# Patient Record
Sex: MALE | Race: WHITE
[De-identification: names, ages, dates, MRNs, and addresses within clinical notes are randomized per-mention and may not be internally consistent; named-entity substitution may affect disease eponyms.]

---

## 2018-05-13 ENCOUNTER — HOSPITAL ENCOUNTER (EMERGENCY)
Dept: HOSPITAL 11 - JP.ED | Age: 73
Discharge: HOME | End: 2018-05-13
Payer: MEDICARE
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
## 2018-05-13 DIAGNOSIS — E78.00: ICD-10-CM

## 2018-05-13 DIAGNOSIS — Z79.899: ICD-10-CM

## 2018-05-13 DIAGNOSIS — S30.861A: Primary | ICD-10-CM

## 2018-05-13 DIAGNOSIS — I10: ICD-10-CM

## 2018-05-13 NOTE — EDM.PDOC
ED HPI GENERAL MEDICAL PROBLEM





- General


Chief Complaint: Bite:Animal, Insect


Stated Complaint: WOOD TICK ON BELLY AREA


Time Seen by Provider: 05/13/18 08:40


Source of Information: Reports: Patient, Family, RN Notes Reviewed


History Limitations: Reports: No Limitations





- History of Present Illness


INITIAL COMMENTS - FREE TEXT/NARRATIVE: 





72-year-old gentleman presents to the emergency department today with a tick 

attached to his abdomen, he was out in the woods yesterday found the tick this 

morning it is a deer tick no complaints at this time





- Related Data


 Allergies











Allergy/AdvReac Type Severity Reaction Status Date / Time


 


No Known Allergies Allergy   Verified 05/13/18 08:24











Home Meds: 


 Home Meds





Losartan [Cozaar] 1 tab PO DAILY 05/13/18 [History]


Morphine Sulfate 1 tab PO DAILY 05/13/18 [History]


Morphine Sulfate [Morphine Sulfate ER] 1 tab PO DAILY 05/13/18 [History]


Pravastatin [Pravachol] 1 tab PO DAILY 05/13/18 [History]


Triamterene/Hydrochlorothiazid [Triamterene-HCTZ 37.5-25 MG] 0.5 tab PO DAILY 05 /13/18 [History]











Past Medical History


HEENT History: Reports: Hard of Hearing, Impaired Vision


Cardiovascular History: Reports: High Cholesterol, Hypertension


Musculoskeletal History: Reports: Amputation, Arthritis, Back Pain, Chronic


Neurological History: Reports: Concussion





- Past Surgical History


GI Surgical History: Reports: Appendectomy


Neurological Surgical History: Reports: Spinal Fusion, Other (See Below)


Other Neurological Surgeries/Procedures: spinal stenosis.  7 back surgeries


Musculoskeletal Surgical History: Reports: Hip Replacement





Social & Family History





- Tobacco Use


Smoking Status *Q: Never Smoker





- Recreational Drug Use


Recreational Drug Use: No





ED ROS GENERAL





- Review of Systems


Review Of Systems: See Below


Skin: Reports: Wound





ED EXAM, ANIMAL BITE





- Physical Exam


Exam: See Below


Text/Narrative:: 





Examination abdomen is soft and nontender there is a deer tick attached in the 

right lower quadrant it is removed with a forceps, complete tick removal


Exam Limited By: No Limitations


General Appearance: Alert, WD/WN, No Apparent Distress





Course





- Vital Signs


Last Recorded V/S: 





 Last Vital Signs











Temp  97.7 F   05/13/18 08:23


 


Pulse  74   05/13/18 08:23


 


Resp  14   05/13/18 08:23


 


BP  161/78 H  05/13/18 08:23


 


Pulse Ox  97   05/13/18 08:23














Departure





- Departure


Time of Disposition: 08:44


Disposition: Home, Self-Care 01


Condition: Good


Clinical Impression: 


Tick bite of abdomen


Qualifiers:


 Encounter type: initial encounter Qualified Code(s): S30.861A - Insect bite (

nonvenomous) of abdominal wall, initial encounter; W57.XXXA - Bitten or stung 

by nonvenomous insect and other nonvenomous arthropods, initial encounter








- Discharge Information


Referrals: 


Deb Prescott MD [Primary Care Provider] - 


Additional Instructions: 


Take 200 mg of the doxycycline 1, follow-up with primary care provider as 

needed





- Assessment/Plan


Plan: 





Assessment





Acuity = acute





Site and laterality = deer tick exposure  





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





Manifestations = none  





Location of injury =  Home





Lab values = none  





Plan


Prescription written for doxycycline 200 mg 1 follow-up with primary care as 

necessary  

















 This note was dictated using dragon voice recognition software please call 

with any questions on syntax or grammar.

## 2019-02-15 ENCOUNTER — HOSPITAL ENCOUNTER (EMERGENCY)
Dept: HOSPITAL 11 - JP.ED | Age: 74
Discharge: HOME | End: 2019-02-15
Payer: MEDICARE

## 2019-02-15 DIAGNOSIS — E78.00: ICD-10-CM

## 2019-02-15 DIAGNOSIS — Z79.899: ICD-10-CM

## 2019-02-15 DIAGNOSIS — Z79.82: ICD-10-CM

## 2019-02-15 DIAGNOSIS — R55: Primary | ICD-10-CM

## 2019-02-15 DIAGNOSIS — I10: ICD-10-CM

## 2019-02-15 PROCEDURE — 99284 EMERGENCY DEPT VISIT MOD MDM: CPT

## 2019-02-15 PROCEDURE — 36415 COLL VENOUS BLD VENIPUNCTURE: CPT

## 2019-02-15 PROCEDURE — 80048 BASIC METABOLIC PNL TOTAL CA: CPT

## 2019-02-15 PROCEDURE — 81001 URINALYSIS AUTO W/SCOPE: CPT

## 2019-02-15 PROCEDURE — 84484 ASSAY OF TROPONIN QUANT: CPT

## 2019-02-15 PROCEDURE — 85027 COMPLETE CBC AUTOMATED: CPT

## 2019-02-15 NOTE — EDM.PDOC
ED HPI GENERAL MEDICAL PROBLEM





- General


Chief Complaint: Cardiovascular Problem


Stated Complaint: MEDICAL VIA NORTH


Time Seen by Provider: 02/15/19 19:35


Source of Information: Reports: Patient, EMS, Family, Old Records


History Limitations: Reports: No Limitations





- History of Present Illness


INITIAL COMMENTS - FREE TEXT/NARRATIVE: 





72 yo male here via EMS from a local restaurant after a syncopal episode. Felt 

like he might pass out with light-headedness and then passed out with his head 

on the table. Has a remote hx of a similar event. Recently had a stent(s) 

placed at [facility] and is wearing a heart monitor. Was unaware of rapid, 

slow or irregular heart beat before/during his spell today. He denies any 

recent CP, bleeding, fever, or diarrhea/vomiting. Was diaphoretic when he came 

to today. Feels normal now in the ER. Is not diabetic. 





[facility] did not call him after this episode so wife/patient feel that this 

must not have been due to a heart rhythm issue as that is what happens since 

wearing the monitor. 


Onset: Today


Onset Date: 02/15/19


Onset Time: 18:55


Duration: Minutes:, Resolved Prior to Arrival


Location: Reports: Generalized


Quality: Reports: Other (no pain)


Severity: Severe


Improves with: Reports: Other (time)


Worsens with: Reports: Other (unknown)


Context: Reports: Other (unknown, See HPI)


Associated Symptoms: Reports: Diaphoresis, Syncope.  Denies: Chest Pain, Fever/

Chills, Seizure


Treatments PTA: Reports: Other (see below) (none)





- Related Data


 Allergies











Allergy/AdvReac Type Severity Reaction Status Date / Time


 


No Known Allergies Allergy   Verified 02/15/19 19:48











Home Meds: 


 Home Meds





Losartan [Cozaar] 100 mg PO DAILY 05/13/18 [History]


Morphine Sulfate 30 mg PO DAILY 05/13/18 [History]


Morphine Sulfate [Morphine Sulfate ER] 15 mg PO DAILY 05/13/18 [History]


Aspirin [Adult Low Dose Aspirin EC] 81 mg PO DAILY 02/15/19 [History]


Calcium Citrate 200 mg PO DAILY 02/15/19 [History]


Carvedilol [Coreg] 25 mg PO BID 02/15/19 [History]


Chlorthalidone 25 mg PO DAILY 02/15/19 [History]


Clopidogrel [Plavix] 75 mg PO DAILY 02/15/19 [History]


Magnesium 200 mg PO DAILY 02/15/19 [History]


Potassium Chloride 20 meq PO DAILY 02/15/19 [History]


atorvaSTATin Calcium [Atorvastatin Calcium] 80 mg PO BEDTIME 02/15/19 [History]











Past Medical History


HEENT History: Reports: Hard of Hearing, Impaired Vision


Cardiovascular History: Reports: High Cholesterol, Hypertension


Musculoskeletal History: Reports: Amputation, Arthritis, Back Pain, Chronic


Neurological History: Reports: Concussion





- Past Surgical History


GI Surgical History: Reports: Appendectomy


Neurological Surgical History: Reports: Spinal Fusion, Other (See Below)


Other Neurological Surgeries/Procedures: spinal stenosis.  7 back surgeries


Musculoskeletal Surgical History: Reports: Hip Replacement





ED ROS GENERAL





- Review of Systems


Review Of Systems: See Below


Constitutional: Reports: Diaphoresis


HEENT: Reports: No Symptoms


Respiratory: Reports: No Symptoms


Cardiovascular: Reports: Syncope


Endocrine: Reports: No Symptoms


GI/Abdominal: Reports: No Symptoms


: Reports: No Symptoms


Musculoskeletal: Reports: No Symptoms


Skin: Reports: Diaphoresis


Neurological: Reports: No Symptoms


Psychiatric: Reports: No Symptoms





ED EXAM, GENERAL





- Physical Exam


Exam: See Below


Exam Limited By: No Limitations


General Appearance: Alert, WD/WN, No Apparent Distress


Eye Exam: Bilateral Eye: Normal Inspection


Ears: Normal External Exam, Normal Canal, Hearing Grossly Normal, Normal TMs


Ear Exam: Bilateral Ear: Auricle Normal, Canal Normal, TM normal


Nose: Normal Inspection, Normal Mucosa, No Blood


Throat/Mouth: Normal Inspection, Normal Lips, Normal Oropharynx, Normal Voice, 

No Airway Compromise


Head: Atraumatic, Normocephalic


Neck: Normal Inspection, Supple, Non-Tender


Respiratory/Chest: No Respiratory Distress, Lungs Clear, Normal Breath Sounds, 

No Accessory Muscle Use


Cardiovascular: Regular Rate, Rhythm, No Edema.  No: Bradycardia, Tachycardia


GI/Abdominal: Normal Bowel Sounds, Soft, Non-Tender, No Distention


Back Exam: Normal Inspection.  No: CVA Tenderness (R), CVA Tenderness (L)


Extremities: Normal Inspection, Normal Range of Motion, Non-Tender, No Pedal 

Edema


Neurological: Alert, Oriented, CN II-XII Intact, Normal Cognition, No Motor/

Sensory Deficits


Psychiatric: Normal Affect, Normal Mood


Skin Exam: Warm, Dry, Intact, Normal Color, No Rash





Course





- Vital Signs


Last Recorded V/S: 


 Last Vital Signs











Temp  36.2 C   02/15/19 19:36


 


Pulse  67   02/15/19 21:32


 


Resp  13   02/15/19 21:32


 


BP  113/53 L  02/15/19 21:32


 


Pulse Ox  97   02/15/19 21:32








 





Orthostatic Blood Pressure [     113/54


Standing]                        


Orthostatic Blood Pressure [     119/53


Sitting]                         


Orthostatic Blood Pressure [     135/62


Supine]                          











- Orders/Labs/Meds


Orders: 


 Active Orders 24 hr











 Category Date Time Status


 


 Cardiac Monitoring [RC] .As Directed Care  02/15/19 19:32 Active


 


 Orthostatic Vital Signs [RC] ASDIRECTED Care  02/15/19 19:47 Active











Labs: 


 Laboratory Tests











  02/15/19 02/15/19 02/15/19 Range/Units





  19:32 19:32 21:14 


 


WBC  8.4    (4.5-11.0)  K/uL


 


RBC  4.68    (4.30-5.90)  M/uL


 


Hgb  13.1  D    (12.0-15.0)  g/dL


 


Hct  40.3    (40.0-54.0)  %


 


MCV  86    (80-98)  fL


 


MCH  28    (27-31)  pg


 


MCHC  33    (32-36)  %


 


Plt Count  184    (150-400)  K/uL


 


Sodium   132 L   (140-148)  mmol/L


 


Potassium   3.8   (3.6-5.2)  mmol/L


 


Chloride   95 L   (100-108)  mmol/L


 


Carbon Dioxide   30   (21-32)  mmol/L


 


Anion Gap   10.8   (5.0-14.0)  mmol/L


 


BUN   25 H   (7-18)  mg/dL


 


Creatinine   1.2   (0.8-1.3)  mg/dL


 


Est Cr Clr Drug Dosing   63.81   mL/min


 


Estimated GFR (MDRD)   60   (>60)  


 


Glucose   124 H   ()  mg/dL


 


Calcium   9.1   (8.5-10.1)  mg/dL


 


Troponin I   < 0.017   (0.000-0.056)  ng/mL


 


Urine Color    Yellow  


 


Urine Appearance    Clear  


 


Urine pH    6.0  (4.5-8.0)  


 


Ur Specific Gravity    1.010  (1.008-1.030)  


 


Urine Protein    Negative  (NEGATIVE)  mg/dL


 


Urine Glucose (UA)    Normal  (NEGATIVE)  mg/dL


 


Urine Ketones    Negative  (NEGATIVE)  mg/dL


 


Urine Occult Blood    Negative  (NEGATIVE)  


 


Urine Nitrite    Negative  (NEGAITVE)  


 


Urine Bilirubin    Negative  (NEGATIVE)  


 


Urine Urobilinogen    Normal  (NORMAL)  mg/dL


 


Ur Leukocyte Esterase    Negative  (NEGATIVE)  


 


Urine RBC    Not seen  (0-5)  


 


Urine WBC    Not seen  (0-5)  


 


Ur Epithelial Cells    Not seen  


 


Amorphous Sediment    Not seen  


 


Urine Bacteria    Rare  


 


Urine Mucus    Not seen  











Meds: 


Medications














Discontinued Medications














Generic Name Dose Route Start Last Admin





  Trade Name Kenyatta  PRN Reason Stop Dose Admin


 


Sodium Chloride  500 mls @ 1,000 mls/hr  02/15/19 20:13  02/15/19 20:23





  Normal Saline  IV  02/15/19 20:42  1,000 mls/hr





  .BOLUS ONE   Administration





     





     





     





     














Departure





- Departure


Time of Disposition: 22:00


Disposition: Home, Self-Care 01


Condition: Good


Clinical Impression: 


Syncope


Qualifiers:


 Syncope type: unspecified Qualified Code(s): R55 - Syncope and collapse





Instructions:  Tilt Table Test, Syncope, Easy-to-Read


Referrals: 


PCP,None [Primary Care Provider] - 


Forms:  ED Department Discharge


Additional Instructions: 


Continue your current meds. No driving. If you feel like passing out, lay down 

quickly. See your cardiologist next week, if unable then see your family 

doctor. Return as needed. 





- My Orders


Last 24 Hours: 


My Active Orders





02/15/19 19:32


Cardiac Monitoring [RC] .As Directed 





02/15/19 19:47


Orthostatic Vital Signs [RC] ASDIRECTED 














- Assessment/Plan


Last 24 Hours: 


My Active Orders





02/15/19 19:32


Cardiac Monitoring [RC] .As Directed 





02/15/19 19:47


Orthostatic Vital Signs [RC] ASDIRECTED

## 2021-08-24 NOTE — EDM.PDOC
ED HPI GENERAL MEDICAL PROBLEM





- General


Chief Complaint: Abdominal Pain


Stated Complaint: ABDOMINAL PAIN


Time Seen by Provider: 08/24/21 11:35


Source of Information: Reports: Patient, Family, Old Records, RN Notes Reviewed


History Limitations: Reports: No Limitations





- History of Present Illness


INITIAL COMMENTS - FREE TEXT/NARRATIVE: 





75-year-old gentleman presents emergency department day complaint of diarrhea, 

he states his been ill for about 3 to 4 days started having chills body aches 

fevers up to 103 the diarrhea has been about every hour. Mostly watery. He did 

report to the clinic today underwent some laboratory testing CBC CMP 

unremarkable except for potassium low at 2.9 Covid test was negative EKG shows 

multiple PVCs. I did receive a call from clinic provider Dr. King who was 

concerned about a possible small bowel obstruction however this gentleman will 

pass gas and is not having any abdominal pain at this time plain film was 

obtained at the clinic results are still pending





- Related Data


                                    Allergies











Allergy/AdvReac Type Severity Reaction Status Date / Time


 


No Known Allergies Allergy   Verified 08/24/21 11:22











Home Meds: 


                                    Home Meds





Losartan [Cozaar] 100 mg PO DAILY 05/13/18 [History]


Morphine Sulfate 30 mg PO DAILY 05/13/18 [History]


Morphine Sulfate [Morphine Sulfate ER] 15 mg PO DAILY 05/13/18 [History]


Aspirin [Adult Low Dose Aspirin EC] 81 mg PO DAILY 02/15/19 [History]


Calcium Citrate 200 mg PO DAILY 02/15/19 [History]


Chlorthalidone 25 mg PO DAILY 02/15/19 [History]


Magnesium 200 mg PO DAILY 02/15/19 [History]


Potassium Chloride 20 meq PO DAILY 02/15/19 [History]


atorvaSTATin Calcium [Atorvastatin Calcium] 80 mg PO BEDTIME 02/15/19 [History]


carvediloL [Coreg] 25 mg PO BID 02/15/19 [History]


Protandim 1 dose PO BID 08/24/21 [History]


amLODIPine Besylate [Amlodipine Besylate] 5 mg PO BEDTIME 08/24/21 [History]











Past Medical History


HEENT History: Reports: Hard of Hearing, Impaired Vision


Cardiovascular History: Reports: High Cholesterol, Hypertension


Musculoskeletal History: Reports: Amputation, Arthritis, Back Pain, Chronic


Neurological History: Reports: Concussion





- Infectious Disease History


Infectious Disease History: Reports: Chicken Pox





- Past Surgical History


Cardiovascular Surgical History: Reports: Coronary Artery Stent, Other (See 

Below)


Other Cardiovascular Surgeries/Procedures: 2/15/19 Wearing a holter monitor and 

a defibillator vest.


GI Surgical History: Reports: Appendectomy


Neurological Surgical History: Reports: Spinal Fusion, Other (See Below)


Other Neurological Surgeries/Procedures: spinal stenosis.  7 back surgeries


Musculoskeletal Surgical History: Reports: Hip Replacement





Social & Family History





- Family History


Family Medical History: Unobtainable





- Tobacco Use


Tobacco Use Status *Q: Former Tobacco User


Used Tobacco, but Quit: Yes


Month/Year Tobacco Last Used: 1980's





- Caffeine Use


Caffeine Use: Reports: Coffee, Tea





- Recreational Drug Use


Recreational Drug Use: No





ED ROS GENERAL





- Review of Systems


Review Of Systems: See Below


Constitutional: Reports: Fever, Chills


HEENT: Reports: No Symptoms


Respiratory: Reports: No Symptoms


Cardiovascular: Reports: No Symptoms


GI/Abdominal: Reports: Diarrhea, Flatus.  Denies: Abdominal Pain, Nausea


: Reports: No Symptoms


Musculoskeletal: Reports: No Symptoms





ED EXAM, GI/ABD





- Physical Exam


Exam: See Below


Exam Limited By: No Limitations


General Appearance: Alert, WD/WN, No Apparent Distress


Respiratory/Chest: No Respiratory Distress, Lungs Clear, Normal Breath Sounds, 

No Accessory Muscle Use, Chest Non-Tender


Cardiovascular: Regular Rate, Rhythm, No Murmur


GI/Abdominal Exam: Soft, Non-Tender





Course





- Vital Signs


Last Recorded V/S: 


                                Last Vital Signs











Temp  97.7 F   08/24/21 11:30


 


Pulse  81   08/24/21 14:17


 


Resp  16   08/24/21 14:17


 


BP  136/70   08/24/21 14:17


 


Pulse Ox  98   08/24/21 14:17














- Orders/Labs/Meds


Orders: 


                               Active Orders 24 hr











 Category Date Time Status


 


 Peripheral IV Care [RC] .AS DIRECTED Care  08/24/21 11:47 Active


 


 Lactated Ringers [Ringers, Lactated] 1,000 ml Med  08/24/21 12:00 Active





 IV ASDIRECTED   


 


 Sodium Chloride 0.9% [Saline Flush] Med  08/24/21 11:46 Active





 10 ml FLUSH ASDIRECTED PRN   


 


 Peripheral IV Insertion Adult [OM.PC] Urgent Oth  08/24/21 11:46 Ordered








                                Medication Orders





Lactated Ringer's (Ringers, Lactated)  1,000 mls @ 999 mls/hr IV ASDIRECTED TERESA


   Last Admin: 08/24/21 12:41  Dose: 999 mls/hr


   Documented by: JESSIKA


Sodium Chloride (Sodium Chloride 0.9% 10 Ml Syringe)  10 ml FLUSH ASDIRECTED PRN


   PRN Reason: Keep Vein Open


   Last Admin: 08/24/21 12:47  Dose: 10 ml


   Documented by: JESSIKA








Labs: 


                                Laboratory Tests











  08/24/21 08/24/21 08/24/21 Range/Units





  12:04 12:04 13:22 


 


Lactic Acid   1.7   (0.4-2.0)  mmol/L


 


Troponin I  < 0.017    (0.000-0.056)  ng/mL


 


Urine Color    Yellow  (YELLOW)  


 


Urine Appearance    Slightly cloudy A  (CLEAR)  


 


Urine pH    5.5  (5.0-8.0)  


 


Ur Specific Gravity    1.020  (1.008-1.030)  


 


Urine Protein    100 H  (NEGATIVE)  mg/dL


 


Urine Glucose (UA)    Negative  (NEGATIVE)  mg/dL


 


Urine Ketones    Negative  (NEGATIVE)  mg/dL


 


Urine Occult Blood    Moderate H  (NEGATIVE)  


 


Urine Nitrite    Negative  (NEGATIVE)  


 


Urine Bilirubin    Negative  (NEGATIVE)  


 


Urine Urobilinogen    0.2  (0.2-1.0)  EU/dL


 


Ur Leukocyte Esterase    Negative  (NEGATIVE)  


 


Urine RBC    5-10 H  (0-5)  


 


Urine WBC    Not seen  (0-5)  


 


Ur Epithelial Cells    Few  


 


Amorphous Sediment    Not seen  


 


Urine Bacteria    Not seen  


 


Urine Mucus    Moderate  


 


Urine Other      











Meds: 


Medications











Generic Name Dose Route Start Last Admin





  Trade Name Freq  PRN Reason Stop Dose Admin


 


Lactated Ringer's  1,000 mls @ 999 mls/hr  08/24/21 12:00  08/24/21 12:41





  Ringers, Lactated  IV   999 mls/hr





  ASDIRECTED TERESA   Administration


 


Sodium Chloride  10 ml  08/24/21 11:46  08/24/21 12:47





  Sodium Chloride 0.9% 10 Ml Syringe  FLUSH   10 ml





  ASDIRECTED PRN   Administration





  Keep Vein Open  














Discontinued Medications














Generic Name Dose Route Start Last Admin





  Trade Name Freq  PRN Reason Stop Dose Admin


 


Potassium Chloride 20 meq/  112 mls @ 56 mls/hr  08/24/21 12:15  08/24/21 12:43





Lidocaine HCl 2 ml/ Sodium  IV  08/24/21 14:14  56 mls/hr





Chloride  ONETIME ONE   Administration


 


Loperamide HCl  2 mg  08/24/21 11:48  08/24/21 12:26





  Loperamide 2 Mg Cap  PO  08/24/21 11:49  2 mg





  ONETIME ONE   Administration


 


Morphine Sulfate  30 mg  08/24/21 13:45  08/24/21 13:31





  Morphine 30 Mg Tab.Er  PO  08/24/21 13:46  30 mg





  ONETIME ONE   Administration


 


Ondansetron HCl  4 mg  08/24/21 13:19  08/24/21 13:24





  Ondansetron 4 Mg/2 Ml Sdv  IVPUSH  08/24/21 13:20  4 mg





  ONETIME ONE   Administration


 


Potassium Chloride  40 meq  08/24/21 11:48  08/24/21 12:27





  Potassium Chloride 20 Meq Tab.Er  PO  08/24/21 11:49  40 meq





  ONETIME ONE   Administration














Departure





- Departure


Time of Disposition: 15:32


Disposition: Home, Self-Care 01


Condition: Fair


Clinical Impression: 


 Hypokalemia








- Discharge Information


Instructions:  Hypokalemia


Referrals: 


Roslyn Ramirez MD [Primary Care Provider] - 


Forms:  ED Department Discharge


Additional Instructions: 


Continue to push fluids at home, use the Imodium as needed  please followup with

your primary care provider in 3-5 days if not better, please call return to the 

emergency department with worsening of symptoms.,





Sepsis Event Note (ED)





- Evaluation


Sepsis Screening Result: No Definite Risk





- Focused Exam


Vital Signs: 


                                   Vital Signs











  Temp Pulse Resp BP Pulse Ox


 


 08/24/21 14:17   81  16  136/70  98


 


 08/24/21 13:28   76  16  114/64  96


 


 08/24/21 12:17   73  16  111/65  99


 


 08/24/21 11:30  97.7 F  70  16  122/69  97


 


 08/24/21 11:18  97.7 F  70  16  122/69  97














- My Orders


Last 24 Hours: 


My Active Orders





08/24/21 11:46


Sodium Chloride 0.9% [Saline Flush]   10 ml FLUSH ASDIRECTED PRN 


Peripheral IV Insertion Adult [OM.PC] Urgent 





08/24/21 11:47


Peripheral IV Care [RC] .AS DIRECTED 





08/24/21 12:00


Lactated Ringers [Ringers, Lactated] 1,000 ml IV ASDIRECTED 














- Assessment/Plan


Last 24 Hours: 


My Active Orders





08/24/21 11:46


Sodium Chloride 0.9% [Saline Flush]   10 ml FLUSH ASDIRECTED PRN 


Peripheral IV Insertion Adult [OM.PC] Urgent 





08/24/21 11:47


Peripheral IV Care [RC] .AS DIRECTED 





08/24/21 12:00


Lactated Ringers [Ringers, Lactated] 1,000 ml IV ASDIRECTED 











Plan: 





Assessment





Acuity = acute





Site and laterality = hypokalemia





Etiology  = secondary to diarrhea





Manifestations = body aches





Location of injury =  Home





Lab values = lactic acid, troponin urinalysis within normal limits





Plan


Potassium was replaced both IV and oral, had good improvement with Imodium and 1

L of fluids, he is going to continue to push fluids at home and monitor his 

symptoms follow-up primary care 3 to 5 days if not better

















 This note was dictated using dragon voice recognition software please call with

any questions on syntax or grammar.

## 2021-08-27 NOTE — EDM.PDOC
ED HPI GENERAL MEDICAL PROBLEM





- General


Chief Complaint: Cardiovascular Problem


Stated Complaint: LOW BP, WEAKNESS


Time Seen by Provider: 08/27/21 20:24


Source of Information: Reports: Patient, Family (Wife at bedside)


History Limitations: Reports: No Limitations





- History of Present Illness


INITIAL COMMENTS - FREE TEXT/NARRATIVE: 





Patient presents emergency room today via EMS secondary to concern about low 

blood pressures.  His wife states that she took his blood pressure around 1940 

secondary to concern about how he looked that he seemed somewhat lethargic in 

nature.  She noted blood pressure in the right arm to be 67/42 she did repeat 

this 68/44 she then took blood pressures in his left arm noted to be 84/41 she 

then repeated it twice and noted to be 64/42 and 65/41 his pulse she says was 

stable at 63 states that she has not previously checked his blood pressure for 

over a month at that time it was noted to be in around the 110s over 60 to 70s. 

His history this past week is significant for having diarrhea Saturday through 

Tuesday, they went to the walk-in clinic on Tuesday for the continued diarrhea 

episodes he was sent to the emergency room where he received normal saline IV 

fluids as well as potassium replacement and was sent home.  Wife states that 

diarrhea continued through yesterday and she finally gave him an Imodium which 

has pretty much stopped significant loose stools per the report he states that 

he has had just a little bit and watery bowel movements today but markedly 

decreased from previous episodes.  He is eating without any difficulty he is 

taking fluids without any difficulty denies any vomiting episodes.  Of note he 

does have blood pressure medications listed and when asked when they were last 

taken wife states that he took his medications around 1830 tonight.  She has not

been checking his blood pressure prior to giving medications nor has she checked

it over the last couple of days since he was in the emergency room but he has 

continued to take his blood pressure medication





PMH/Meds--reviewed in the EMR


NKDA





Tob--former


EtOH/Drugs--denies





Denies having had COVID infection, he has had his COVID immunization





- Related Data


                                    Allergies











Allergy/AdvReac Type Severity Reaction Status Date / Time


 


No Known Allergies Allergy   Verified 08/24/21 11:22











Home Meds: 


                                    Home Meds





Losartan [Cozaar] 100 mg PO DAILY 05/13/18 [History]


Morphine Sulfate 30 mg PO DAILY 05/13/18 [History]


Morphine Sulfate [Morphine Sulfate ER] 15 mg PO DAILY 05/13/18 [History]


Aspirin [Adult Low Dose Aspirin EC] 81 mg PO DAILY 02/15/19 [History]


Calcium Citrate 200 mg PO DAILY 02/15/19 [History]


Chlorthalidone 25 mg PO DAILY 02/15/19 [History]


Magnesium 200 mg PO DAILY 02/15/19 [History]


Potassium Chloride 20 meq PO DAILY 02/15/19 [History]


atorvaSTATin Calcium [Atorvastatin Calcium] 80 mg PO BEDTIME 02/15/19 [History]


carvediloL [Coreg] 25 mg PO BID 02/15/19 [History]


Protandim 1 dose PO BID 08/24/21 [History]


amLODIPine Besylate [Amlodipine Besylate] 5 mg PO BEDTIME 08/24/21 [History]











Past Medical History


HEENT History: Reports: Hard of Hearing, Impaired Vision


Cardiovascular History: Reports: High Cholesterol, Hypertension


Musculoskeletal History: Reports: Amputation, Arthritis, Back Pain, Chronic


Neurological History: Reports: Concussion





- Infectious Disease History


Infectious Disease History: Reports: Chicken Pox





- Past Surgical History


Cardiovascular Surgical History: Reports: Coronary Artery Stent, Other (See 

Below)


Other Cardiovascular Surgeries/Procedures: 2/15/19 Wearing a holter monitor and 

a defibillator vest.


GI Surgical History: Reports: Appendectomy


Neurological Surgical History: Reports: Spinal Fusion, Other (See Below)


Other Neurological Surgeries/Procedures: spinal stenosis.  7 back surgeries


Musculoskeletal Surgical History: Reports: Hip Replacement





Social & Family History





- Family History


Family Medical History: Unobtainable





- Caffeine Use


Caffeine Use: Reports: Coffee





ED ROS GENERAL





- Review of Systems


Review Of Systems: Comprehensive ROS is negative, except as noted in HPI.


Constitutional: Reports: Weakness.  Denies: Fever, Chills, Decreased Appetite


Respiratory: Reports: No Symptoms


Cardiovascular: Reports: No Symptoms


GI/Abdominal: Reports: Diarrhea.  Denies: Abdominal Pain, Decreased Appetite, 

Nausea, Vomiting


Neurological: Denies: Dizziness





ED EXAM, GENERAL





- Physical Exam


Exam: See Below


Exam Limited By: No Limitations


General Appearance: Alert, WD/WN, No Apparent Distress


Eye Exam: Bilateral Eye: EOMI, Normal Inspection, PERRL


Ears: Normal External Exam, Hearing Grossly Normal


Nose: Normal Inspection


Throat/Mouth: Normal Inspection, Normal Lips, Normal Oropharynx, Normal Voice, 

No Airway Compromise


Head: Atraumatic, Normocephalic


Neck: Normal Inspection, Supple, Non-Tender, Full Range of Motion.  No: 

Lymphadenopathy (R), Lymphadenopathy (L)


Respiratory/Chest: No Respiratory Distress, Lungs Clear, Normal Breath Sounds


Cardiovascular: Normal Peripheral Pulses, Regular Rate, Rhythm, No Edema, No 

Murmur


Peripheral Pulses: 2+: Radial (L), Radial (R)


GI/Abdominal: Normal Bowel Sounds, Soft, Non-Tender, No Distention


 (Male) Exam: Deferred


Rectal (Males) Exam: Deferred


Back Exam: Normal Inspection, Full Range of Motion (Sat up in bed unassisted for

 exam posterior lungs as well as back he had no lightheaded or dizziness episode

 during that movement)


Extremities: Normal Inspection, Normal Range of Motion, No Pedal Edema, Normal 

Capillary Refill


Neurological: Alert, Oriented, Normal Cognition, No Motor/Sensory Deficits


Psychiatric: Normal Affect, Normal Mood


Skin Exam: Warm, Dry, Intact, Normal Color


  ** #1 Interpretation


EKG Date: 08/27/21


Time: 21:00 (EKG read by physician at 2108, no STEMI is noted)


Rhythm: NSR


Rate (Beats/Min): 64


Axis: Normal


P-Wave: Present (KS interval 156)


QRS: Normal (QRS duration 107)


ST-T: Normal


QT: Normal (QT/BMk414/422)


EKG Interpretation Comments: 





Normal EKG





Course





- Vital Signs


Text/Narrative:: 





2026--while in room after HPI/ROS/PE did discuss with them likely cause is a 

combination of his diuretic diarrhea related dehydration and continued use of 

blood pressure medications without checking home blood pressure.  Given report 

that he took his blood pressures at 1830 and then was noted to have low blood 

pressures at 1948 is likely that is a direct result of his blood pressure 

medications in the setting.  Will provide IV fluids and check electrolytes 

secondary report of having received potassium infusion on Tuesday here in the 

emergency room no further concerns are noted then will discharge home.  Did 

discuss with wife that she should check blood pressure for the next several days

 prior to giving his medications as long as blood pressure is greater than or 

equal to to 120/80 then he may have his blood pressure medications if blood 

pressure is less than 100 then he may not have his blood pressure medications 

but should hold until the next dose is due she verbalized understanding 

agreement with this plan





2116--notified of critical value by lab creatinine4.0; record reviewed for 

recent labs given ER visit on Tuesday and no creatinine is noted on those lab 

results although he did have labs completed at the clinic and it was documented 

by  Officer that CBC and CMP were unremarkable for K2.9.  This appears that 

patient is acute renal failure secondary to diarrhea induced dehydration





2129--Labs have returned as completed at this time mild white blood cell 

elevation at 11.3 chemistry panel noted for a fvevon077, potassium3.1, 

peftpbkb87 as well as elevated BUN/emrolmmtjz80/4.0 with an estimated GFR of 15.

  Magnesium was normal2.0 at this time will discuss with patient and wife ER 

findings and recommendation for admission with IV fluid hydration and following 

of labs secondary to acute renal failure.  they verbalized understanding/agreeme

nt with plan of care





2135--placed to hospitalist awaiting return call at this time for discussion of 

admission


2139--callback from Dr. Galvin hospitalist service case was discussed and he 

accepts for admission at this time will be in shortly





- Orders/Labs/Meds


Orders: 


                               Active Orders 24 hr











 Category Date Time Status


 


 Sodium Chloride 0.9% [Normal Saline] 500 ml Med  08/27/21 20:49 Active





 IV .BOLUS   


 


 Sodium Chloride 0.9% [Saline Flush] Med  08/27/21 20:49 Active





 10 ml FLUSH ASDIRECTED PRN   


 


 Saline Lock Insert [OM.PC] Routine Oth  08/27/21 20:49 Ordered


 


 EKG 12 Lead [EK] Routine Ther  08/27/21 20:49 Ordered








                                Medication Orders





Sodium Chloride (Normal Saline)  500 mls @ 250 mls/hr IV .BOLUS ONE


   Stop: 08/27/21 22:48


   Last Admin: 08/27/21 21:10  Dose: 250 mls/hr


   Documented by: MADI


Sodium Chloride (Sodium Chloride 0.9% 10 Ml Syringe)  10 ml FLUSH ASDIRECTED PRN


   PRN Reason: Keep Vein Open


   Last Admin: 08/27/21 21:11  Dose: 10 ml


   Documented by: MADI








Labs: 


                                Laboratory Tests











  08/27/21 08/27/21 Range/Units





  20:48 20:49 


 


WBC   11.3 H  (4.5-11.0)  K/uL


 


RBC   4.31  (4.30-5.90)  M/uL


 


Hgb   12.7  (12.0-15.0)  g/dL


 


Hct   37.2 L  (40.0-54.0)  %


 


MCV   86  (80-98)  fL


 


MCH   30  (27-31)  pg


 


MCHC   34  (32-36)  %


 


Plt Count   263  (150-400)  K/uL


 


Neut % (Auto)   65.0  (36-66)  %


 


Lymph % (Auto)   21.1 L  (24-44)  %


 


Mono % (Auto)   10.5 H  (2-6)  %


 


Eos % (Auto)   3.0  (2-4)  %


 


Baso % (Auto)   0.4  (0-1)  %


 


Sodium  131 L   (140-148)  mmol/L


 


Potassium  3.1 L   (3.6-5.2)  mmol/L


 


Chloride  95 L   (100-108)  mmol/L


 


Carbon Dioxide  27   (21-32)  mmol/L


 


Anion Gap  12.1   (5.0-14.0)  mmol/L


 


BUN  54 H D   (7-18)  mg/dL


 


Creatinine  4.0 H* D   (0.8-1.3)  mg/dL


 


Est Cr Clr Drug Dosing  TNP   


 


Estimated GFR (MDRD)  15 L   (>60)  


 


Glucose  117 H   ()  mg/dL


 


Calcium  8.8   (8.5-10.1)  mg/dL


 


Magnesium  2.0   (1.8-2.4)  mg/dL











Meds: 


Medications











Generic Name Dose Route Start Last Admin





  Trade Name Freq  PRN Reason Stop Dose Admin


 


Sodium Chloride  500 mls @ 250 mls/hr  08/27/21 20:49  08/27/21 21:10





  Normal Saline  IV  08/27/21 22:48  250 mls/hr





  .BOLUS ONE   Administration


 


Sodium Chloride  10 ml  08/27/21 20:49  08/27/21 21:11





  Sodium Chloride 0.9% 10 Ml Syringe  FLUSH   10 ml





  ASDIRECTED PRN   Administration





  Keep Vein Open  














Departure





- Departure


Time of Disposition: 21:43


Disposition: Refer to Observation


Clinical Impression: 


 Hypotension due to drugs, Dehydration





Diarrhea


Qualifiers:


 Diarrhea type: unspecified type Qualified Code(s): R19.7 - Diarrhea, 

unspecified





Acute renal failure


Qualifiers:


 Acute renal failure type: unspecified Qualified Code(s): N17.9 - Acute kidney 

failure, unspecified





Instructions:  Dehydration, Elderly, Easy-to-Read, Diarrhea, Adult, Easy-to-Read


Referrals: 


PCP,None [Primary Care Provider] - 


Forms:  ED Department Discharge





- My Orders


Last 24 Hours: 


My Active Orders





08/27/21 20:49


Sodium Chloride 0.9% [Normal Saline] 500 ml IV .BOLUS 


Sodium Chloride 0.9% [Saline Flush]   10 ml FLUSH ASDIRECTED PRN 


Saline Lock Insert [OM.PC] Routine 


EKG 12 Lead [EK] Routine 














- Assessment/Plan


Last 24 Hours: 


My Active Orders





08/27/21 20:49


Sodium Chloride 0.9% [Normal Saline] 500 ml IV .BOLUS 


Sodium Chloride 0.9% [Saline Flush]   10 ml FLUSH ASDIRECTED PRN 


Saline Lock Insert [OM.PC] Routine 


EKG 12 Lead [EK] Routine

## 2021-08-27 NOTE — PCM.HP.2
H&P History of Present Illness





- General


Date of Service: 08/27/21


Admit Problem/Dx: 


                           Admission Diagnosis/Problem





Admission Diagnosis/Problem      Acute kidney injury








Source of Information: Patient, Family, Provider


History Limitations: Reports: No Limitations





- History of Present Illness


Initial Comments - Free Text/Narative: 





CC: my BP was low 





HPI: Yaya presents to the emergency room tonight with hypotension noted at home 

as well as an episode of weakness and feeling off.  He reports that he was first

sick with diarrhea just shy of 1 week ago.  He had some fevers at the time of 

onset but these have resolved.  Also had some myalgias at that time but these 

have resolved.  Diarrhea persisted for a few days so he was seen in the clinic. 

Work-up there were unremarkable other than hypokalemia so he was sent to the 

emergency room a few days ago to get some potassium supplementation.  Diarrhea 

continued until yesterday but after a dose of Imodium it seems to have slowed d

own.  He reports decent intake of both food and fluids but less than normal.  He

has not had fevers in several days.  He has not had any abdominal pain or 

vomiting but has had some nausea.  Is not aware of any sick contacts nobody else

in the house is sick.  No complaints of chest pain or shortness of breath.  No 

skin rashes.  No exotic travel or suspect ingestions.





Work-up in the emergency room revealed a creatinine of 4 along with hyponatremia

and mild hypokalemia.  Patient will be admitted for management of acute renal 

injury in the setting of diarrhea with dehydration complicated by diuretic use.





- Related Data


Allergies/Adverse Reactions: 


                                    Allergies











Allergy/AdvReac Type Severity Reaction Status Date / Time


 


No Known Allergies Allergy   Verified 08/24/21 11:22











Home Medications: 


                                    Home Meds





Losartan [Cozaar] 100 mg PO DAILY 05/13/18 [History]


Morphine Sulfate 30 mg PO DAILY 05/13/18 [History]


Morphine Sulfate [Morphine Sulfate ER] 15 mg PO QPM 05/13/18 [History]


Aspirin [Adult Low Dose Aspirin EC] 81 mg PO DAILY 02/15/19 [History]


Calcium Citrate 200 mg PO DAILY 02/15/19 [History]


Chlorthalidone 25 mg PO DAILY 02/15/19 [History]


Magnesium 200 mg PO DAILY 02/15/19 [History]


Potassium Chloride 20 meq PO DAILY 02/15/19 [History]


atorvaSTATin Calcium [Atorvastatin Calcium] 80 mg PO BEDTIME 02/15/19 [History]


carvediloL [Coreg] 25 mg PO BID 02/15/19 [History]


Protandim 1 dose PO BID 08/24/21 [History]


amLODIPine Besylate [Amlodipine Besylate] 5 mg PO BEDTIME 08/24/21 [History]











Past Medical History


HEENT History: Reports: Hard of Hearing, Impaired Vision


Cardiovascular History: Reports: High Cholesterol, Hypertension


Musculoskeletal History: Reports: Amputation, Arthritis, Back Pain, Chronic


Neurological History: Reports: Concussion





- Infectious Disease History


Infectious Disease History: Reports: Chicken Pox





- Past Surgical History


Cardiovascular Surgical History: Reports: Coronary Artery Stent, Other (See 

Below)


Other Cardiovascular Surgeries/Procedures: 2/15/19 Wearing a holter monitor and 

a defibillator vest.


GI Surgical History: Reports: Appendectomy


Neurological Surgical History: Reports: Spinal Fusion, Other (See Below)


Other Neurological Surgeries/Procedures: spinal stenosis.  7 back surgeries


Musculoskeletal Surgical History: Reports: Hip Replacement





Social & Family History





- Family History


Family Medical History: Unobtainable





- Tobacco Use


Tobacco Use Status *Q: Never Tobacco User


Second Hand Smoke Exposure: No





- Caffeine Use


Caffeine Use: Reports: None





- Alcohol Use


Alcohol Use History: Yes


Alcohol Use in Last Twelve Months: No


Alcohol Use Comment: quit 1983





- Recreational Drug Use


Recreational Drug Use: No





H&P Review of Systems





- Review of Systems:


Review Of Systems: See Below


Free Text/Narrative: 





A complete 12 point review of systems was obtained.  Pertinent positives and 

negatives are noted in the history of present illness.  All other systems were 

reviewed and were negative except as noted.





Exam





- Exam


Exam: See Below





- Vital Signs


Vital Signs: 


                                Last Vital Signs











Temp  36.3 C   08/27/21 22:09


 


Pulse  65   08/27/21 22:09


 


Resp  15   08/27/21 22:09


 


BP  99/46 L  08/27/21 22:09


 


Pulse Ox  97   08/27/21 22:09











Weight: 81.647 kg





- Exam


Quality Assessment: No: Supplemental Oxygen


General: Alert, Oriented, Cooperative.  No: Mild Distress


HEENT: Conjunctiva Clear.  No: Mucosa Moist & Pink (dry), Scleral Icterus


Neck: Supple, Trachea Midline


Lungs: Clear to Auscultation, Normal Respiratory Effort


Cardiovascular: Regular Rate, Regular Rhythm, Systolic Murmur (LUSB)


GI/Abdominal Exam: Normal Bowel Sounds, Soft, Non-Tender, No Distention, No Mass


Back Exam: Normal Inspection, Full Range of Motion


Extremities: No Pedal Edema.  No: Increased Warmth


Peripheral Pulses: 2+: Dorsalis Pedis (L), Dorsalis Pedis (R)


Skin: Warm, Dry


Neuro Extensive - Mental Status: Alert, Oriented x3, Nl Response to Commands


Neuro Extensive - Motor, Sensory, Reflexes: No: Dysarthria, Abnormal Motor, 

Tremor


Psychiatric: Alert, Normal Affect





- Patient Data


Lab Results Last 24 hrs: 


                         Laboratory Results - last 24 hr











  08/27/21 08/27/21 Range/Units





  20:48 20:49 


 


WBC   11.3 H  (4.5-11.0)  K/uL


 


RBC   4.31  (4.30-5.90)  M/uL


 


Hgb   12.7  (12.0-15.0)  g/dL


 


Hct   37.2 L  (40.0-54.0)  %


 


MCV   86  (80-98)  fL


 


MCH   30  (27-31)  pg


 


MCHC   34  (32-36)  %


 


Plt Count   263  (150-400)  K/uL


 


Neut % (Auto)   65.0  (36-66)  %


 


Lymph % (Auto)   21.1 L  (24-44)  %


 


Mono % (Auto)   10.5 H  (2-6)  %


 


Eos % (Auto)   3.0  (2-4)  %


 


Baso % (Auto)   0.4  (0-1)  %


 


Sodium  131 L   (140-148)  mmol/L


 


Potassium  3.1 L   (3.6-5.2)  mmol/L


 


Chloride  95 L   (100-108)  mmol/L


 


Carbon Dioxide  27   (21-32)  mmol/L


 


Anion Gap  12.1   (5.0-14.0)  mmol/L


 


BUN  54 H D   (7-18)  mg/dL


 


Creatinine  4.0 H* D   (0.8-1.3)  mg/dL


 


Est Cr Clr Drug Dosing  TNP   


 


Estimated GFR (MDRD)  15 L   (>60)  


 


Glucose  117 H   ()  mg/dL


 


Calcium  8.8   (8.5-10.1)  mg/dL


 


Magnesium  2.0   (1.8-2.4)  mg/dL











Result Diagrams: 


                                 08/27/21 20:49





                                 08/27/21 20:48


  ** #1 Interpretation


EKG Date: 08/27/21


Rhythm: NSR


Rate (Beats/Min): 68


Axis: Normal


P-Wave: Present


QRS: Normal


ST-T: Normal


QT: Normal


NY/PQ Interval: normal


Comparison: Change From Previous EKG (compared to EKG from 11/19 there are no 

PVC's noted)


EKG Interpretation Comments: 





This EKG image was personally reviewed





Sepsis Event Note





- Evaluation


Sepsis Screening Result: No Definite Risk





- Focused Exam


Vital Signs: 


                                   Vital Signs











  Temp Pulse Resp BP Pulse Ox


 


 08/27/21 22:09  36.3 C  65  15  99/46 L  97


 


 08/27/21 21:33   65  15  99/46 L  97


 


 08/27/21 21:03   68  19  81/45 L  100


 


 08/27/21 20:33   71  18  93/50 L 


 


 08/27/21 20:23  36.3 C  68  21 H  94/45 L  98














*Q Meaningful Use (ADM)





- VTE Risk Assess *Q


Each Risk Factor Represents 1 Point: None


Total Score 1 Point Risk Factors: 0


Each Risk Factor Represents 2 Points: Age 60 - 74 Years


Total Score 2 Point Risk Factors: 2


Each Risk Factor Represents 3 Points: None


Total Score 3 Point Risk Factors: 0


Each Risk Factor Represents 5 Points: None


Total Score 5 Point Risk Factors: 0


Venous Thromboembolism Risk Factor Score *Q: 2





- Problem List


(1) Acute renal failure


SNOMED Code(s): 51594590


   ICD Code: N17.9 - ACUTE KIDNEY FAILURE, UNSPECIFIED   Status: Acute   Current

 Visit: Yes   


Qualifiers: 


   Acute renal failure type: with acute tubular necrosis   Qualified Code(s): 

N17.0 - Acute kidney failure with tubular necrosis   





(2) Hypokalemia


SNOMED Code(s): 90241060


   ICD Code: E87.6 - HYPOKALEMIA   Status: Acute   Current Visit: No   





(3) Diarrhea


SNOMED Code(s): 91132293


   ICD Code: R19.7 - DIARRHEA, UNSPECIFIED   Status: Acute   Current Visit: Yes 

  


Qualifiers: 


   Diarrhea type: unspecified type   Qualified Code(s): R19.7 - Diarrhea, 

unspecified   





(4) Dehydration


SNOMED Code(s): 57020728


   ICD Code: E86.0 - DEHYDRATION   Status: Acute   Current Visit: Yes   





(5) CAD (coronary artery disease)


SNOMED Code(s): 38208315


   ICD Code: I25.10 - ATHSCL HEART DISEASE OF NATIVE CORONARY ARTERY W/O ANG 

PCTRS   Status: Chronic   Current Visit: Yes   


Qualifiers: 


   Coronary Disease-Associated Artery/Lesion type: native artery   Native vs. 

transplanted heart: native heart   Associated angina: without angina   Qualified

 Code(s): I25.10 - Atherosclerotic heart disease of native coronary artery wi

thout angina pectoris   





(6) HTN (hypertension)


SNOMED Code(s): 51811443


   ICD Code: I10 - ESSENTIAL (PRIMARY) HYPERTENSION   Status: Chronic   Current 

Visit: Yes   


Qualifiers: 


   Hypertension type: primary hypertension   Qualified Code(s): I10 - Essential 

(primary) hypertension   


Problem List Initiated/Reviewed/Updated: Yes


Orders Last 24hrs: 


                               Active Orders 24 hr











 Category Date Time Status


 


 Patient Status Manage Transfer [TRANSFER] Routine ADT  08/27/21 22:42 Ordered


 


 CORONAVIRUS COVID-19 RAPID [MOLEC] Stat Lab  08/27/21 21:43 Ordered


 


 Sodium Chloride 0.9% [Normal Saline] 500 ml Med  08/27/21 20:49 Active





 IV .BOLUS   


 


 Sodium Chloride 0.9% [Saline Flush] Med  08/27/21 20:49 Active





 10 ml FLUSH ASDIRECTED PRN   


 


 Saline Lock Insert [OM.PC] Routine Oth  08/27/21 20:49 Ordered


 


 Resuscitation Status Routine Resus Stat  08/27/21 22:44 Ordered


 


 EKG 12 Lead [EK] Routine Ther  08/27/21 20:49 Ordered








                                Medication Orders





Sodium Chloride (Normal Saline)  500 mls @ 250 mls/hr IV .BOLUS ONE


   Stop: 08/27/21 22:48


   Last Admin: 08/27/21 21:10  Dose: 250 mls/hr


   Documented by: MADI


Sodium Chloride (Sodium Chloride 0.9% 10 Ml Syringe)  10 ml FLUSH ASDIRECTED PRN


   PRN Reason: Keep Vein Open


   Last Admin: 08/27/21 21:11  Dose: 10 ml


   Documented by: MADI








Assessment/Plan Comment:: 





ASSESSMENT AND PLAN - 





Acute kidney injury-secondary to acute tubular necrosis in the setting of 

diarrhea leading to dehydration complicated by diuretic use.  He was hypotensive

 at home but blood pressures in the emergency room have been acceptable though 

on the low side of normal.  He did take his antihypertensives tonight.


-Aggressive IV fluids through the night


-Repeat labs in the morning


-Hold BP meds





Acute diarrhea-sounds like a viral illness and seems to be resolving.  White 

count mildly elevated but examination benign and much less diarrhea today.  

Hopefully this will improve further with hydration.


-Consider additional work-up if diarrhea worsens


-Management as above


-Imodium as needed





Essential hypertension-on multiple antihypertensives which will be on hold until

 his blood pressure and kidney function improved.





Coronary artery disease-asymptomatic.


-Continue aspirin but hold other medical management until blood pressure 

improves





Maintenance issues - 


-DVT prophylaxis-mechanical


-GI prophylaxis-not indicated


-Nutrition-regular


-Solares catheter-not indicated





CODE STATUS -full code





Admission justification -this patient will be admitted for inpatient services 

and is medically appropriate meeting medical necessity for inpatient admission 

as outlined in my documentation.  I reasonably expect the patient will require 

inpatient services that span a period time over 2 midnights. I reasonably expect

 this patient to be discharged or transferred within 96 hours after admission to

 the [facility].





Disposition -I anticipate discharge home after the hospital stay





Primary care physician -Dr. Roslyn Galvin M.D.





- Mortality Measure


Prognosis:: Good

## 2021-08-28 NOTE — PCM.PN
- General Info


Date of Service: 08/28/21


Subjective Update: 





There were no acute events overnight.  Patient reports that he feels well and 

offers no concerns.  No headache, shortness of breath, abdominal pain or nausea.

 No diarrhea since admission.  Creatinine slightly better and potassium is 

slightly better.  Blood pressures are still on the low side of normal but slowly

improving.


Functional Status: Reports: Pain Controlled, Tolerating Diet





- Review of Systems


General: Denies: Fever, Weakness


Gastrointestinal: Denies: Diarrhea





- Patient Data


Vitals - Most Recent: 


                                Last Vital Signs











Temp  37.3 C   08/28/21 07:52


 


Pulse  69   08/28/21 07:52


 


Resp  18   08/28/21 07:52


 


BP  102/55 L  08/28/21 07:52


 


Pulse Ox  97   08/28/21 07:52











Weight - Most Recent: 85.729 kg


I&O - Last 24 Hours: 


                                 Intake & Output











 08/27/21 08/28/21 08/28/21





 22:59 06:59 14:59


 


Intake Total  240 360


 


Output Total  1 


 


Balance  239 360











Lab Results Last 24 Hours: 


                         Laboratory Results - last 24 hr











  08/27/21 08/27/21 08/27/21 Range/Units





  20:48 20:49 21:43 


 


WBC   11.3 H   (4.5-11.0)  K/uL


 


RBC   4.31   (4.30-5.90)  M/uL


 


Hgb   12.7   (12.0-15.0)  g/dL


 


Hct   37.2 L   (40.0-54.0)  %


 


MCV   86   (80-98)  fL


 


MCH   30   (27-31)  pg


 


MCHC   34   (32-36)  %


 


Plt Count   263   (150-400)  K/uL


 


Neut % (Auto)   65.0   (36-66)  %


 


Lymph % (Auto)   21.1 L   (24-44)  %


 


Mono % (Auto)   10.5 H   (2-6)  %


 


Eos % (Auto)   3.0   (2-4)  %


 


Baso % (Auto)   0.4   (0-1)  %


 


Sodium  131 L    (140-148)  mmol/L


 


Potassium  3.1 L    (3.6-5.2)  mmol/L


 


Chloride  95 L    (100-108)  mmol/L


 


Carbon Dioxide  27    (21-32)  mmol/L


 


Anion Gap  12.1    (5.0-14.0)  mmol/L


 


BUN  54 H D    (7-18)  mg/dL


 


Creatinine  4.0 H* D    (0.8-1.3)  mg/dL


 


Est Cr Clr Drug Dosing  TNP    


 


Estimated GFR (MDRD)  15 L    (>60)  


 


Glucose  117 H    ()  mg/dL


 


Calcium  8.8    (8.5-10.1)  mg/dL


 


Magnesium  2.0    (1.8-2.4)  mg/dL


 


SARS CoV-2 RNA Rapid CHRISTOPHER    Negative  














  08/28/21 08/28/21 Range/Units





  04:20 04:20 


 


WBC  10.4   (4.5-11.0)  K/uL


 


RBC  4.07 L   (4.30-5.90)  M/uL


 


Hgb  11.7 L   (12.0-15.0)  g/dL


 


Hct  34.9 L   (40.0-54.0)  %


 


MCV  86   (80-98)  fL


 


MCH  29   (27-31)  pg


 


MCHC  34   (32-36)  %


 


Plt Count  245   (150-400)  K/uL


 


Neut % (Auto)    (36-66)  %


 


Lymph % (Auto)    (24-44)  %


 


Mono % (Auto)    (2-6)  %


 


Eos % (Auto)    (2-4)  %


 


Baso % (Auto)    (0-1)  %


 


Sodium   132 L  (140-148)  mmol/L


 


Potassium   3.3 L  (3.6-5.2)  mmol/L


 


Chloride   98 L  (100-108)  mmol/L


 


Carbon Dioxide   27  (21-32)  mmol/L


 


Anion Gap   10.3  (5.0-14.0)  mmol/L


 


BUN   54 H  (7-18)  mg/dL


 


Creatinine   3.7 H*  (0.8-1.3)  mg/dL


 


Est Cr Clr Drug Dosing   19.52  


 


Estimated GFR (MDRD)   16 L  (>60)  


 


Glucose   110 H  ()  mg/dL


 


Calcium   8.4 L  (8.5-10.1)  mg/dL


 


Magnesium    (1.8-2.4)  mg/dL


 


SARS CoV-2 RNA Rapid CHRISTOPHER    











Med Orders - Current: 


                               Current Medications





Acetaminophen (Acetaminophen 325 Mg Tab)  650 mg PO Q4H PRN


   PRN Reason: Pain (Mild 1-3)/fever


Aspirin (Aspirin 81 Mg Tab.Ec)  81 mg PO DAILY [facility]


   Last Admin: 08/28/21 08:41 Dose:  81 mg


   Documented by: 


Atorvastatin Calcium (Atorvastatin 20 Mg Tab)  80 mg PO BEDTIME [facility]


Calcium Carbonate/Glycine (Calcium Carbonate 500 Mg Tab.Chew)  500 mg PO DAILY 

[facility]


   Last Admin: 08/28/21 08:40 Dose:  500 mg


   Documented by: 


Lactobacillus Rhamnosus (Lactobacillus Rhamnosus Gg (Probiotic) Cap)  1 cap PO 

BID [facility]


   Last Admin: 08/28/21 08:41 Dose:  1 cap


   Documented by: 


Loperamide HCl (Loperamide 2 Mg Cap)  2 mg PO Q4H PRN


   PRN Reason: Diarrhea


Lorazepam (Lorazepam 2 Mg/Ml Sdv)  0.5 mg IVPUSH Q4H PRN


   PRN Reason: Nausea/Vomiting


Magnesium Oxide (Magnesium Oxide 400 Mg Tab)  200 mg PO DAILY [facility]


   Last Admin: 08/28/21 08:41 Dose:  200 mg


   Documented by: 


Melatonin (Melatonin 3 Mg Tab)  9 mg PO BEDTIME PRN


   PRN Reason: Sleep


Morphine Sulfate (Morphine 15 Mg Tab)  30 mg PO DAILY@1100 [facility]


Morphine Sulfate (Morphine 15 Mg Tab.Er)  15 mg PO QPM [facility]


Ondansetron HCl (Ondansetron 4 Mg/2 Ml Sdv)  4 mg IV Q6H PRN


   PRN Reason: Nausea/Vomiting


Ondansetron HCl (Ondansetron 4 Mg Tab.Dis)  4 mg PO Q6H PRN


   PRN Reason: Nausea able to take PO


Sodium Chloride (Sodium Chloride 0.9% 10 Ml Syringe)  10 ml FLUSH ASDIRECTED PRN


   PRN Reason: Keep Vein Open


   Last Admin: 08/27/21 21:11 Dose:  10 ml


   Documented by: 





Discontinued Medications





Sodium Chloride (Normal Saline)  500 mls @ 250 mls/hr IV .BOLUS ONE


   Stop: 08/27/21 22:48


   Last Admin: 08/27/21 21:10 Dose:  250 mls/hr


   Documented by: 


Potassium Chloride/Sodium Chloride (Normal Saline With 20 Meq Kcl)  1,000 mls @ 

125 mls/hr IV ASDIRECTED [facility]


   Last Admin: 08/28/21 08:40 Dose:  125 mls/hr


   Documented by: 


Potassium Chloride (Potassium Chloride 20 Meq Tab.Er)  40 meq PO ONETIME ONE


   Stop: 08/28/21 00:16


   Last Admin: 08/28/21 00:44 Dose:  40 meq


   Documented by: 


Potassium Chloride (Potassium Chloride 20 Meq Tab.Er)  40 meq PO ONETIME ONE


   Stop: 08/28/21 09:01


   Last Admin: 08/28/21 08:44 Dose:  40 meq


   Documented by: 











- Exam


Quality Assessment: No: Supplemental Oxygen


General: Alert, Oriented, Cooperative, No Acute Distress


Lungs: Normal Respiratory Effort


GI/Abdominal Exam: Soft, No Distention


Extremities: No Pedal Edema


Skin: Warm, Dry


Psy/Mental Status: Alert, Normal Affect





- Patient Data


Lab Results Last 24 hrs: 


                         Laboratory Results - last 24 hr











  08/27/21 08/27/21 08/27/21 Range/Units





  20:48 20:49 21:43 


 


WBC   11.3 H   (4.5-11.0)  K/uL


 


RBC   4.31   (4.30-5.90)  M/uL


 


Hgb   12.7   (12.0-15.0)  g/dL


 


Hct   37.2 L   (40.0-54.0)  %


 


MCV   86   (80-98)  fL


 


MCH   30   (27-31)  pg


 


MCHC   34   (32-36)  %


 


Plt Count   263   (150-400)  K/uL


 


Neut % (Auto)   65.0   (36-66)  %


 


Lymph % (Auto)   21.1 L   (24-44)  %


 


Mono % (Auto)   10.5 H   (2-6)  %


 


Eos % (Auto)   3.0   (2-4)  %


 


Baso % (Auto)   0.4   (0-1)  %


 


Sodium  131 L    (140-148)  mmol/L


 


Potassium  3.1 L    (3.6-5.2)  mmol/L


 


Chloride  95 L    (100-108)  mmol/L


 


Carbon Dioxide  27    (21-32)  mmol/L


 


Anion Gap  12.1    (5.0-14.0)  mmol/L


 


BUN  54 H D    (7-18)  mg/dL


 


Creatinine  4.0 H* D    (0.8-1.3)  mg/dL


 


Est Cr Clr Drug Dosing  TNP    


 


Estimated GFR (MDRD)  15 L    (>60)  


 


Glucose  117 H    ()  mg/dL


 


Calcium  8.8    (8.5-10.1)  mg/dL


 


Magnesium  2.0    (1.8-2.4)  mg/dL


 


SARS CoV-2 RNA Rapid CHRISTOPHER    Negative  














  08/28/21 08/28/21 Range/Units





  04:20 04:20 


 


WBC  10.4   (4.5-11.0)  K/uL


 


RBC  4.07 L   (4.30-5.90)  M/uL


 


Hgb  11.7 L   (12.0-15.0)  g/dL


 


Hct  34.9 L   (40.0-54.0)  %


 


MCV  86   (80-98)  fL


 


MCH  29   (27-31)  pg


 


MCHC  34   (32-36)  %


 


Plt Count  245   (150-400)  K/uL


 


Neut % (Auto)    (36-66)  %


 


Lymph % (Auto)    (24-44)  %


 


Mono % (Auto)    (2-6)  %


 


Eos % (Auto)    (2-4)  %


 


Baso % (Auto)    (0-1)  %


 


Sodium   132 L  (140-148)  mmol/L


 


Potassium   3.3 L  (3.6-5.2)  mmol/L


 


Chloride   98 L  (100-108)  mmol/L


 


Carbon Dioxide   27  (21-32)  mmol/L


 


Anion Gap   10.3  (5.0-14.0)  mmol/L


 


BUN   54 H  (7-18)  mg/dL


 


Creatinine   3.7 H*  (0.8-1.3)  mg/dL


 


Est Cr Clr Drug Dosing   19.52  


 


Estimated GFR (MDRD)   16 L  (>60)  


 


Glucose   110 H  ()  mg/dL


 


Calcium   8.4 L  (8.5-10.1)  mg/dL


 


Magnesium    (1.8-2.4)  mg/dL


 


SARS CoV-2 RNA Rapid CHRISTOPHER    











Result Diagrams: 


                                 08/28/21 04:20





                                 08/28/21 04:20





Sepsis Event Note





- Evaluation


Sepsis Screening Result: No Definite Risk





- Focused Exam


Vital Signs: 


                                   Vital Signs











  Temp Pulse Resp BP Pulse Ox


 


 08/28/21 07:52  37.3 C  69  18  102/55 L  97


 


 08/28/21 00:44   67  18  99/52 L  92 L


 


 08/27/21 23:04   64  18  90/54 L  91 L


 


 08/27/21 22:33   66  21 H  89/51 L  97


 


 08/27/21 22:09  36.3 C  65  15  99/46 L  97


 


 08/27/21 22:03  37.0 C  64  12  94/53 L  98














- Problem List & Annotations


(1) Acute renal failure


SNOMED Code(s): 35076209


   Code(s): N17.9 - ACUTE KIDNEY FAILURE, UNSPECIFIED   Status: Acute   Current 

Visit: Yes   


Qualifiers: 


   Acute renal failure type: with acute tubular necrosis   Qualified Code(s): 

N17.0 - Acute kidney failure with tubular necrosis   





(2) Hypokalemia


SNOMED Code(s): 92138565


   Code(s): E87.6 - HYPOKALEMIA   Status: Acute   Current Visit: No   





(3) Diarrhea


SNOMED Code(s): 99247468


   Code(s): R19.7 - DIARRHEA, UNSPECIFIED   Status: Acute   Current Visit: Yes  




Qualifiers: 


   Diarrhea type: unspecified type   Qualified Code(s): R19.7 - Diarrhea, 

unspecified   





(4) Dehydration


SNOMED Code(s): 03365424


   Code(s): E86.0 - DEHYDRATION   Status: Acute   Current Visit: Yes   





(5) CAD (coronary artery disease)


SNOMED Code(s): 87731014


   Code(s): I25.10 - ATHSCL HEART DISEASE OF NATIVE CORONARY ARTERY W/O ANG 

PCTRS   Status: Chronic   Current Visit: Yes   


Qualifiers: 


   Coronary Disease-Associated Artery/Lesion type: native artery   Native vs. 

transplanted heart: native heart   Associated angina: without angina   Qualified

Code(s): I25.10 - Atherosclerotic heart disease of native coronary artery 

without angina pectoris   





(6) HTN (hypertension)


SNOMED Code(s): 47211182


   Code(s): I10 - ESSENTIAL (PRIMARY) HYPERTENSION   Status: Chronic   Current 

Visit: Yes   


Qualifiers: 


   Hypertension type: primary hypertension   Qualified Code(s): I10 - Essential 

(primary) hypertension   





- Problem List Review


Problem List Initiated/Reviewed/Updated: Yes





- My Orders


Last 24 Hours: 


My Active Orders





08/27/21 22:44


Resuscitation Status Routine 





08/28/21 00:15


Acetaminophen [TylenoL]   650 mg PO Q4H PRN 


LORazepam [Ativan]   0.5 mg IVPUSH Q4H PRN 


Loperamide [Imodium]   2 mg PO Q4H PRN 


Melatonin   9 mg PO BEDTIME PRN 


Ondansetron [Zofran ODT]   4 mg PO Q6H PRN 


Ondansetron [Zofran]   4 mg IV Q6H PRN 





08/28/21 00:15


Patient Status [ADT] Routine 


Antiembolic Devices [RC] .Routine 


Cardiac Monitoring [RC] CONTINUOUS 


Intake and Output [RC] QSHIFT 


Notify Provider Vital Signs [RC] ASDIRECTED 


Oxygen Therapy [RC] PRN 


Up ad Viktoriya [RC] ASDIRECTED 


VTE/DVT Education [RC] Per Unit Routine 


Vital Signs [RC] Q4H 


Sequential Compression Device [OM.PC] Routine 





08/28/21 09:00


Aspirin [Halfprin]   81 mg PO DAILY 


Calcium Carbonate [Tums]   500 mg PO DAILY 


Lactobacillus Rhamnosus GG [Culturelle]   1 cap PO BID 


Magnesium Oxide   200 mg PO DAILY 





08/28/21 09:51


Communication Order [RC] BEDTIME 





08/28/21 10:00


NS + KCl 20mEq/L [Normal Saline with 20 mEq KCl] 1,000 ml IV ASDIRECTED 





08/28/21 11:00


Morphine   30 mg PO DAILY@1100 





08/28/21 15:00


POTASSIUM,K [CHEM] Timed 





08/28/21 Dinner


Regular Diet [DIET] 


Morphine [MS Contin]   15 mg PO QPM 





08/28/21 21:00


atorvaSTATin [Lipitor]   80 mg PO BEDTIME 





08/29/21 05:00


BASIC METABOLIC PANEL,BMP [CHEM] Timed 














- Plan


Plan:: 





ASSESSMENT AND PLAN - 





Acute kidney injury-secondary to acute tubular necrosis in the setting of 

diarrhea leading to dehydration complicated by diuretic use.  Blood pressure 

slightly better.  Creatinine level marginally better today.  Symptomatically 

doing well.


-Continue IV fluids throughout the day at reduced rate


-Repeat labs in the morning


-Hold BP meds





Acute diarrhea-sounds like a viral illness and seems to be resolving.  White 

count now normal and diarrhea seems to have resolved.


-Consider additional work-up if diarrhea returns


-Management as above


-Imodium as needed





Hypokalemia-mild.  Improving with supplementation but still low.


-40 mEq this morning


-Repeat potassium level this afternoon and supplement as indicated





Essential hypertension-on multiple antihypertensives which will be on hold until

 his blood pressure and kidney function improved.





Coronary artery disease-asymptomatic.


-Continue aspirin but hold other medical management until blood pressure 

improves





Maintenance issues - 


-DVT prophylaxis-mechanical


-GI prophylaxis-not indicated


-Nutrition-regular





Disposition -I anticipate discharge home after the hospital stay, possibly 

tomorrow but likely 2 more days with ongoing hypotension and significant 

reduction in kidney function.





Primary care physician -Dr. Roslyn Galvin M.D.

## 2021-08-29 NOTE — PCM.DCSUM1
**Discharge Summary





- Hospital Course


Brief History: 75-year-old male with history of essential hypertension, coronary

artery disease who presented with weakness and hypotension from home. He was 

admitted for management of acute kidney injury and hypokalemia related to recent

diarrhea and diuretic therapy.


Diagnosis: Stroke: No





- Discharge Data


Discharge Date: 08/29/21


Discharge Disposition: Home, Self-Care 01


Condition: Good





- Referral to Home Health


Primary Care Physician: 


PCP None








- Discharge Diagnosis/Problem(s)


(1) Acute renal failure


SNOMED Code(s): 17028393


   ICD Code: N17.9 - ACUTE KIDNEY FAILURE, UNSPECIFIED   Status: Acute   Current

Visit: Yes   


Qualifiers: 


   Acute renal failure type: with acute tubular necrosis   Qualified Code(s): 

N17.0 - Acute kidney failure with tubular necrosis   





(2) Hypokalemia


SNOMED Code(s): 00278326


   ICD Code: E87.6 - HYPOKALEMIA   Status: Acute   Current Visit: No   





(3) Diarrhea


SNOMED Code(s): 92297784


   ICD Code: R19.7 - DIARRHEA, UNSPECIFIED   Status: Acute   Current Visit: Yes 

 


Qualifiers: 


   Diarrhea type: presumed infectious   Qualified Code(s): R19.7 - Diarrhea, 

unspecified   





(4) Dehydration


SNOMED Code(s): 39405067


   ICD Code: E86.0 - DEHYDRATION   Status: Acute   Current Visit: Yes   





(5) CAD (coronary artery disease)


SNOMED Code(s): 67203838


   ICD Code: I25.10 - ATHSCL HEART DISEASE OF NATIVE CORONARY ARTERY W/O ANG 

PCTRS   Status: Chronic   Current Visit: Yes   


Qualifiers: 


   Coronary Disease-Associated Artery/Lesion type: native artery   Native vs. 

transplanted heart: native heart   Associated angina: without angina   Qualified

Code(s): I25.10 - Atherosclerotic heart disease of native coronary artery 

without angina pectoris   





(6) HTN (hypertension)


SNOMED Code(s): 93809105


   ICD Code: I10 - ESSENTIAL (PRIMARY) HYPERTENSION   Status: Chronic   Current 

Visit: Yes   


Qualifiers: 


   Hypertension type: primary hypertension   Qualified Code(s): I10 - Essential 

(primary) hypertension   





- Patient Summary/Data


Hospital Course: 





Yaya had an episode at home where he felt very weak and felt unwell.  His wife 

checked his blood pressure and it was in the 60s.  He was brought to the [facility] room for further evaluation.  Work-up in the emergency room revealed a 

mild leukocytosis as well as acute kidney injury with a creatinine of 4 and a 

low potassium level at 3.1.  The kidney injury was thought to be related to 

recent diarrhea complicated by dehydration and his chronic diuretic use.  His 

blood pressures were in the 90s systolic in the emergency room.  The patient was

started on aggressive IV fluid hydration and his antihypertensive medications 

were held.  The diarrhea was suspected to be viral so we did not initiate any 

antibiotics.  Overnight following admission there were no acute issues.  Blood 

pressures were slowly improving but remained on the low side of normal.  By the 

morning after admission his creatinine was down to 3.7.  Patient felt well at 

this point.  We continued fluids and potassium supplementation through the next 

24 hours.  On the morning of discharge his creatinine is down to 2.6.  Blood 

pressures are in the 110 range systolic.  Patient feels well.  We discussed 

going home versus staying for an additional night of monitoring of his blood 

pressure and kidney function.  He feels well and is hoping to go home.  I think 

he is stable and safe for this transition at this point.  The plan is for him to

stay off of all of his antihypertensive medications for the near future.  They 

do have a blood pressure monitoring device at home.  They will be checking blood

pressures a couple of times a day and will restart medications as outlined in 

the discharge instructions if his blood pressure rises.  He would benefit from 

early follow-up which we will help to facilitate tomorrow when the clinic is 

open.





- Patient Instructions


Diet: Regular Diet as Tolerated


Activity: As Tolerated


Driving: [place] Drive Today


Showering/Bathing: May Shower


Notify Provider of: Fever, Nausea and/or Vomiting


Other/Special Instructions: 1. You were in the hospital for management of acute 

kidney injury, hypokalemia and dehydration caused by a combination of diarrhea 

and diuretic use. Your overall condition and kidney function have been improving

with IV fluids and potassium supplementation. Your diarrhea seems to have 

resolved. I suspect the diarrhea was viral in nature. No specific treatment for 

the diarrhea is needed at this time. I recommend that you drink plenty of water 

with a goal of 64 ounces each day to maintain hydration. When you follow-up with

your primary care provider you should recheck your kidney function and potassium

to make sure that they continue to improve.  2. I recommend that you hold your 

high blood pressure medications including carvedilol, amlodipine, losartan and 

chlorthalidone. If you check your blood pressures a couple of times a day until 

your hospital follow-up it would be helpful to keep track of those numbers. If 

you start to see a trend towards higher pressures, such as systolic pressures 

greater than 150, I would recommend that you restart medications in a stepwise 

fashion starting with carvedilol followed by amlodipine if the pressure remains 

high and then thirdly you could restart losartan. I would save the 

chlorthalidone as the last medication to restart as this can lead to worsening 

dehydration.  3. Please follow-up with primary care or another available 

provider in 3 to 5 days to recheck your blood pressure and kidney function.





- Discharge Plan


*PRESCRIPTION DRUG MONITORING PROGRAM REVIEWED*: Yes


*COPY OF PRESCRIPTION DRUG MONITORING REPORT IN PATIENT HAYLEE: No


Home Medications: 


                                    Home Meds





Losartan [Cozaar] 100 mg PO DAILY 05/13/18 [History]


Morphine Sulfate 30 mg PO DAILY 05/13/18 [History]


Morphine Sulfate [Morphine Sulfate ER] 15 mg PO QPM 05/13/18 [History]


Aspirin [Adult Low Dose Aspirin EC] 81 mg PO DAILY 02/15/19 [History]


Calcium Citrate 200 mg PO DAILY 02/15/19 [History]


Chlorthalidone 25 mg PO DAILY 02/15/19 [History]


Magnesium 200 mg PO DAILY 02/15/19 [History]


Potassium Chloride 20 meq PO DAILY 02/15/19 [History]


atorvaSTATin Calcium [Atorvastatin Calcium] 80 mg PO BEDTIME 02/15/19 [History]


carvediloL [Coreg] 25 mg PO BID 02/15/19 [History]


Protandim 1 dose PO BID 08/24/21 [History]


amLODIPine Besylate [Amlodipine Besylate] 5 mg PO BEDTIME 08/24/21 [History]








Patient Handouts:  Dehydration, Elderly, Easy-to-Read, Diarrhea, Adult, 

Easy-to-Read


Referrals: 


Roslyn Ramirez MD [Ordering Only Provider] -  (Follow-up in 3 to 5 days - follow-

up hospital stay for acute kidney injury, dehydration and hypotension. Would 

benefit from BMP on the day of the visit)





- Discharge Summary/Plan Comment


DC Time >30 min.: No


Total # of Minutes for Discharge Time: 





25





- Patient Data


Vitals - Most Recent: 


                                Last Vital Signs











Temp  37.4 C   08/29/21 07:57


 


Pulse  71   08/29/21 07:57


 


Resp  18   08/29/21 07:57


 


BP  111/58 L  08/29/21 07:57


 


Pulse Ox  97   08/29/21 07:57











Weight - Most Recent: 85.729 kg


I&O - Last 24 hours: 


                                 Intake & Output











 08/28/21 08/29/21 08/29/21





 22:59 06:59 14:59


 


Intake Total 360 3352 1040


 


Output Total  1350 650


 


Balance 360 2002 390











Lab Results - Last 24 hrs: 


                         Laboratory Results - last 24 hr











  08/28/21 08/29/21 Range/Units





  15:18 04:30 


 


Sodium   137 L  (140-148)  mmol/L


 


Potassium  3.8  4.0  (3.6-5.2)  mmol/L


 


Chloride   105  (100-108)  mmol/L


 


Carbon Dioxide   24  (21-32)  mmol/L


 


Anion Gap   12.0  (5.0-14.0)  mmol/L


 


BUN   51 H  (7-18)  mg/dL


 


Creatinine   2.6 H  (0.8-1.3)  mg/dL


 


Est Cr Clr Drug Dosing   27.77  mL/min


 


Estimated GFR (MDRD)   24 L  (>60)  


 


Glucose   100  ()  mg/dL


 


Calcium   7.8 L  (8.5-10.1)  mg/dL











Med Orders - Current: 


                               Current Medications





Acetaminophen (Acetaminophen 325 Mg Tab)  650 mg PO Q4H PRN


   PRN Reason: Pain (Mild 1-3)/fever


Aspirin (Aspirin 81 Mg Tab.Ec)  81 mg PO DAILY [facility]


   Last Admin: 08/29/21 08:01 Dose:  81 mg


   Documented by: 


Atorvastatin Calcium (Atorvastatin 20 Mg Tab)  80 mg PO BEDTIME [facility]


   Last Admin: 08/28/21 20:49 Dose:  80 mg


   Documented by: 


Calcium Carbonate/Glycine (Calcium Carbonate 500 Mg Tab.Chew)  500 mg PO DAILY 

[facility]


   Last Admin: 08/29/21 08:01 Dose:  500 mg


   Documented by: 


Potassium Chloride/Sodium Chloride (Normal Saline With 20 Meq Kcl)  1,000 mls @ 

75 mls/hr IV ASDIRECTED [facility]


   Last Admin: 08/28/21 20:46 Dose:  75 mls/hr


   Documented by: 


Lactobacillus Rhamnosus (Lactobacillus Rhamnosus Gg (Probiotic) Cap)  1 cap PO 

BID [facility]


   Last Admin: 08/29/21 08:01 Dose:  1 cap


   Documented by: 


Loperamide HCl (Loperamide 2 Mg Cap)  2 mg PO Q4H PRN


   PRN Reason: Diarrhea


Lorazepam (Lorazepam 2 Mg/Ml Sdv)  0.5 mg IVPUSH Q4H PRN


   PRN Reason: Nausea/Vomiting


Magnesium Oxide (Magnesium Oxide 400 Mg Tab)  200 mg PO DAILY [facility]


   Last Admin: 08/29/21 08:01 Dose:  200 mg


   Documented by: 


Melatonin (Melatonin 3 Mg Tab)  9 mg PO BEDTIME PRN


   PRN Reason: Sleep


Morphine Sulfate (Morphine 15 Mg Tab)  30 mg PO DAILY@1100 [facility]


   Last Admin: 08/29/21 10:16 Dose:  30 mg


   Documented by: 


Morphine Sulfate (Morphine 15 Mg Tab.Er)  15 mg PO QPM [facility]


   Last Admin: 08/28/21 18:12 Dose:  15 mg


   Documented by: 


Ondansetron HCl (Ondansetron 4 Mg/2 Ml Sdv)  4 mg IV Q6H PRN


   PRN Reason: Nausea/Vomiting


Ondansetron HCl (Ondansetron 4 Mg Tab.Dis)  4 mg PO Q6H PRN


   PRN Reason: Nausea able to take PO


Sodium Chloride (Sodium Chloride 0.9% 10 Ml Syringe)  10 ml FLUSH ASDIRECTED PRN


   PRN Reason: Keep Vein Open


   Last Admin: 08/27/21 21:11 Dose:  10 ml


   Documented by: 





Discontinued Medications





Sodium Chloride (Normal Saline)  500 mls @ 250 mls/hr IV .BOLUS ONE


   Stop: 08/27/21 22:48


   Last Admin: 08/27/21 21:10 Dose:  250 mls/hr


   Documented by: 


Potassium Chloride/Sodium Chloride (Normal Saline With 20 Meq Kcl)  1,000 mls @ 

125 mls/hr IV ASDIRECTED [facility]


   Last Admin: 08/28/21 08:40 Dose:  125 mls/hr


   Documented by: 


Potassium Chloride (Potassium Chloride 20 Meq Tab.Er)  40 meq PO ONETIME ONE


   Stop: 08/28/21 00:16


   Last Admin: 08/28/21 00:44 Dose:  40 meq


   Documented by: 


Potassium Chloride (Potassium Chloride 20 Meq Tab.Er)  40 meq PO ONETIME ONE


   Stop: 08/28/21 09:01


   Last Admin: 08/28/21 08:44 Dose:  40 meq


   Documented by: